# Patient Record
Sex: FEMALE | Race: WHITE | Employment: FULL TIME | URBAN - NONMETROPOLITAN AREA
[De-identification: names, ages, dates, MRNs, and addresses within clinical notes are randomized per-mention and may not be internally consistent; named-entity substitution may affect disease eponyms.]

---

## 2024-10-14 ENCOUNTER — PATIENT MESSAGE (OUTPATIENT)
Dept: FAMILY MEDICINE CLINIC | Age: 44
End: 2024-10-14

## 2024-10-14 DIAGNOSIS — E78.1 HYPERTRIGLYCERIDEMIA: Primary | ICD-10-CM

## 2024-10-14 DIAGNOSIS — Z13.21 ENCOUNTER FOR VITAMIN DEFICIENCY SCREENING: ICD-10-CM

## 2024-10-14 DIAGNOSIS — Z13.1 SCREENING FOR DIABETES MELLITUS: ICD-10-CM

## 2024-10-14 DIAGNOSIS — Z13.29 SCREENING FOR THYROID DISORDER: ICD-10-CM

## 2024-10-21 LAB
ALBUMIN: 4.7 G/DL
ALP BLD-CCNC: 43 U/L
ALT SERPL-CCNC: 23 U/L
ANION GAP SERPL CALCULATED.3IONS-SCNC: NORMAL MMOL/L
AST SERPL-CCNC: 23 U/L
BASOPHILS ABSOLUTE: 0.06 /ΜL
BASOPHILS RELATIVE PERCENT: 1 %
BILIRUB SERPL-MCNC: 0.4 MG/DL (ref 0.1–1.4)
BUN BLDV-MCNC: 17 MG/DL
CALCIUM SERPL-MCNC: 9.7 MG/DL
CHLORIDE BLD-SCNC: 102 MMOL/L
CHOLESTEROL, TOTAL: 214 MG/DL
CHOLESTEROL/HDL RATIO: 6
CO2: 27 MMOL/L
CREAT SERPL-MCNC: 0.7 MG/DL
EOSINOPHILS ABSOLUTE: 0.12 /ΜL
EOSINOPHILS RELATIVE PERCENT: 2 %
GFR, ESTIMATED: NORMAL
GLUCOSE BLD-MCNC: 81 MG/DL
HCT VFR BLD CALC: 39.5 % (ref 36–46)
HDLC SERPL-MCNC: 37 MG/DL (ref 35–70)
HEMOGLOBIN: 13.4 G/DL (ref 12–16)
LDL CHOLESTEROL: 123
LYMPHOCYTES ABSOLUTE: 2.39 /ΜL
LYMPHOCYTES RELATIVE PERCENT: 31 %
MCH RBC QN AUTO: 32 PG
MCHC RBC AUTO-ENTMCNC: 33.9 G/DL
MCV RBC AUTO: 94.3 FL
MONOCYTES ABSOLUTE: 0.71 /ΜL
MONOCYTES RELATIVE PERCENT: 9 %
NEUTROPHILS ABSOLUTE: 4.46 /ΜL
NEUTROPHILS RELATIVE PERCENT: 56 %
NONHDLC SERPL-MCNC: ABNORMAL MG/DL
PDW BLD-RTO: 12.3 %
PLATELET # BLD: 235 K/ΜL
PMV BLD AUTO: 9.5 FL
POTASSIUM SERPL-SCNC: 4.4 MMOL/L
RBC # BLD: 4.19 10^6/ΜL
SODIUM BLD-SCNC: 140 MMOL/L
TOTAL PROTEIN: 6.8 G/DL (ref 6.4–8.2)
TRIGL SERPL-MCNC: 274 MG/DL
TSH SERPL DL<=0.05 MIU/L-ACNC: 3 UIU/ML
VITAMIN D 25-HYDROXY: 52.6
VITAMIN D2, 25 HYDROXY: NORMAL
VITAMIN D3,25 HYDROXY: NORMAL
VLDLC SERPL CALC-MCNC: 55 MG/DL
WBC # BLD: 7.8 10^3/ML

## 2024-10-22 DIAGNOSIS — Z13.21 ENCOUNTER FOR VITAMIN DEFICIENCY SCREENING: ICD-10-CM

## 2024-10-22 DIAGNOSIS — Z13.29 SCREENING FOR THYROID DISORDER: ICD-10-CM

## 2024-10-22 DIAGNOSIS — E78.1 HYPERTRIGLYCERIDEMIA: ICD-10-CM

## 2024-10-22 DIAGNOSIS — Z13.1 SCREENING FOR DIABETES MELLITUS: ICD-10-CM

## 2024-10-23 ENCOUNTER — HOSPITAL ENCOUNTER (OUTPATIENT)
Age: 44
Setting detail: SPECIMEN
Discharge: HOME | End: 2024-10-23
Payer: COMMERCIAL

## 2024-10-23 ENCOUNTER — OFFICE VISIT (OUTPATIENT)
Dept: FAMILY MEDICINE CLINIC | Age: 44
End: 2024-10-23

## 2024-10-23 VITALS
WEIGHT: 177.3 LBS | HEART RATE: 84 BPM | BODY MASS INDEX: 26.26 KG/M2 | OXYGEN SATURATION: 97 % | HEIGHT: 69 IN | SYSTOLIC BLOOD PRESSURE: 116 MMHG | DIASTOLIC BLOOD PRESSURE: 68 MMHG

## 2024-10-23 DIAGNOSIS — E78.1 HYPERTRIGLYCERIDEMIA: ICD-10-CM

## 2024-10-23 DIAGNOSIS — Z12.11 SCREENING FOR COLON CANCER: ICD-10-CM

## 2024-10-23 DIAGNOSIS — N89.8 VAGINAL DISCHARGE: ICD-10-CM

## 2024-10-23 DIAGNOSIS — I47.10 SVT (SUPRAVENTRICULAR TACHYCARDIA) (HCC): ICD-10-CM

## 2024-10-23 DIAGNOSIS — N76.0 BACTERIAL VAGINOSIS: ICD-10-CM

## 2024-10-23 DIAGNOSIS — Z00.00 ROUTINE MEDICAL EXAM: Primary | ICD-10-CM

## 2024-10-23 DIAGNOSIS — B96.89 BACTERIAL VAGINOSIS: ICD-10-CM

## 2024-10-23 LAB
CANDIDA SPECIES: NEGATIVE
GARDNERELLA VAGINALIS: POSITIVE
SOURCE: ABNORMAL
TRICHOMONAS: NEGATIVE

## 2024-10-23 PROCEDURE — 87510 GARDNER VAG DNA DIR PROBE: CPT

## 2024-10-23 PROCEDURE — 87480 CANDIDA DNA DIR PROBE: CPT

## 2024-10-23 PROCEDURE — 87660 TRICHOMONAS VAGIN DIR PROBE: CPT

## 2024-10-23 RX ORDER — ACETAMINOPHEN 160 MG
2000 TABLET,DISINTEGRATING ORAL DAILY
COMMUNITY

## 2024-10-23 SDOH — ECONOMIC STABILITY: INCOME INSECURITY: HOW HARD IS IT FOR YOU TO PAY FOR THE VERY BASICS LIKE FOOD, HOUSING, MEDICAL CARE, AND HEATING?: NOT HARD AT ALL

## 2024-10-23 SDOH — ECONOMIC STABILITY: FOOD INSECURITY: WITHIN THE PAST 12 MONTHS, THE FOOD YOU BOUGHT JUST DIDN'T LAST AND YOU DIDN'T HAVE MONEY TO GET MORE.: NEVER TRUE

## 2024-10-23 SDOH — ECONOMIC STABILITY: FOOD INSECURITY: WITHIN THE PAST 12 MONTHS, YOU WORRIED THAT YOUR FOOD WOULD RUN OUT BEFORE YOU GOT MONEY TO BUY MORE.: NEVER TRUE

## 2024-10-23 NOTE — PROGRESS NOTES
[facility]  [address] [place] [address]  [phone]      Angie Hoffman is a 44 y.o. female who presents today for her medical conditions/complaints as noted below.  Angie Hoffman is c/o of Annual Exam (Physical )      HPI:     Pt here today for yearly physical.    Pt started getting sick with cold sx's approx 10 days ago - cough, post-nasal drainage, sore throat, laryngitis.  Had to miss 3 days of work last week.  Has been taking Mucinex and doing increased fluids.  Never had fever, but did have some body aches.    Has been having more heart palpitations/flutters recently.  When she was recently sick last week and this week, she had some chest tightness/heaviness and \"my heart hurt\".  This is improved this week, but she is still getting flutters often.  Admits she drinks 2 coffee drinks per day and knows she should drink less.  Knows she has also been stressed.  At her last Cardio visit on 8/12, it was noted that her palpitations had been better, but in the past month, she has felt them more.    Switched Toprol-XL 50 mg to bedtime to see if this would help with fatigue - unsure if this helped.  Higher doses of B-blocker made her BP too low.      Seeing Dr. Jane (Podiatry) for probable neuroma on her L foot - was treated with 2 courses of steroid, the second course of which did help.  Feels the pain in L foot is approx 80% improved; will f/u with him when the pain worsens again and he will start steroid injections in the area next.    Reviewed recent lab results with pt today.    Taking Vit D3 2000 IU daily.    Had Cologuard sent to her last year, but she did not complete it.    Has noticed more stress urinary incontinence, mostly with coughing/sneezing.  Has to wear a pantyliner every day.  Seems to be worsening.        Past Medical History:   Diagnosis Date    Hay fever     Headache(784.0)     Heart palpitations     at times no dx    Spider veins

## 2024-10-24 RX ORDER — METRONIDAZOLE 500 MG/1
500 TABLET ORAL 2 TIMES DAILY
Qty: 14 TABLET | Refills: 0 | Status: SHIPPED | OUTPATIENT
Start: 2024-10-24 | End: 2024-10-31

## 2024-10-30 ASSESSMENT — ENCOUNTER SYMPTOMS: CHEST TIGHTNESS: 1

## 2024-11-14 ENCOUNTER — HOSPITAL ENCOUNTER (OUTPATIENT)
Dept: MAMMOGRAPHY | Age: 44
Discharge: HOME | End: 2024-11-16
Attending: FAMILY MEDICINE
Payer: COMMERCIAL

## 2024-11-14 VITALS — BODY MASS INDEX: 25.18 KG/M2 | HEIGHT: 69 IN | WEIGHT: 170 LBS

## 2024-11-14 DIAGNOSIS — Z12.31 SCREENING MAMMOGRAM, ENCOUNTER FOR: ICD-10-CM

## 2024-11-14 PROCEDURE — 77063 BREAST TOMOSYNTHESIS BI: CPT

## 2024-12-16 ENCOUNTER — PATIENT MESSAGE (OUTPATIENT)
Dept: FAMILY MEDICINE CLINIC | Age: 44
End: 2024-12-16

## 2024-12-27 ENCOUNTER — OFFICE VISIT (OUTPATIENT)
Dept: FAMILY MEDICINE CLINIC | Age: 44
End: 2024-12-27

## 2024-12-27 VITALS
SYSTOLIC BLOOD PRESSURE: 118 MMHG | HEART RATE: 81 BPM | WEIGHT: 179.7 LBS | OXYGEN SATURATION: 98 % | BODY MASS INDEX: 26.62 KG/M2 | DIASTOLIC BLOOD PRESSURE: 68 MMHG | HEIGHT: 69 IN

## 2024-12-27 DIAGNOSIS — Z09 HOSPITAL DISCHARGE FOLLOW-UP: ICD-10-CM

## 2024-12-27 DIAGNOSIS — K52.9 ENTERITIS: Primary | ICD-10-CM

## 2024-12-27 PROBLEM — Z72.0 TOBACCO USE: Status: RESOLVED | Noted: 2020-06-27 | Resolved: 2024-12-27

## 2024-12-27 PROBLEM — E78.1 HYPERTRIGLYCERIDEMIA: Status: ACTIVE | Noted: 2024-12-27

## 2024-12-27 PROBLEM — U07.1 COVID-19 VIRUS INFECTION: Status: RESOLVED | Noted: 2021-11-05 | Resolved: 2024-12-27

## 2024-12-27 PROBLEM — N92.1 MENORRHAGIA WITH IRREGULAR CYCLE: Status: RESOLVED | Noted: 2020-12-17 | Resolved: 2024-12-27

## 2024-12-27 PROBLEM — Z98.890 STATUS POST ENDOMETRIAL ABLATION: Status: RESOLVED | Noted: 2021-01-20 | Resolved: 2024-12-27

## 2024-12-27 PROBLEM — K56.609 SMALL BOWEL OBSTRUCTION (HCC): Status: RESOLVED | Noted: 2020-06-27 | Resolved: 2024-12-27

## 2024-12-27 NOTE — PROGRESS NOTES
Post-Discharge Transitional Care Management Progress Note      Angie Hoffman   YOB: 1980    Date of Office Visit:  12/27/2024  Date of Hospital Admission: 12/12/24    Date of Hospital Discharge: 12/13/24    Care management risk score Rising risk (score 2-5) and Complex Care (Scores >=6): No Risk Score On File     Non face to face  following discharge, date last encounter closed (first attempt may have been earlier): *No documented post hospital discharge outreach found in the last 14 days *No documented post hospital discharge outreach found in the last 14 days    Call initiated 2 business days of discharge: *No response recorded in the last 14 days    ASSESSMENT/PLAN:   Enteritis  Hospital discharge follow-up  -     [place] DISCHARGE MEDS RECONCILED W/ CURRENT OUTPATIENT MED LIST    Medical Decision Making: moderate complexity  Return if symptoms worsen or fail to improve in 3-4 days.         Subjective:   HPI:  Follow up of Hospital problems/diagnosis(es):     After discharge, her stool started to form up slightly  Completed courses of both antibiotics x 4 days on discharge    Then, 2-3 days ago, she started to have diarrhea again; yesterday it was completely watery again  Had some lower abd pain with this and the sulfur smell is back mildly  Has not had BM today yet  Denies nausea/vomiting since hospital discharge    Had stool studies and blood cultures that were pending when she was discharged; stool culture and C diff later resulted negative.    Started taking Nutrafol for hair loss on 11/6 - states the smell of these pills mimics the sulfur smell she had in her stool when she went to hospital.  Also realized she has been taking 10,000 mcg of Biotin daily, plus the 2500 mcg in her Nutrafol supplement.    Plans to do a sugar fast for 21 days with her [employer] prayer and fast starting 1/5/25.        Inpatient course: Discharge summary reviewed- see chart.    Interval history/Current status:

## 2025-01-03 ASSESSMENT — ENCOUNTER SYMPTOMS
ABDOMINAL PAIN: 1
NAUSEA: 0
DIARRHEA: 1
VOMITING: 0

## 2025-01-10 ENCOUNTER — PATIENT MESSAGE (OUTPATIENT)
Dept: FAMILY MEDICINE CLINIC | Age: 45
End: 2025-01-10

## 2025-01-10 DIAGNOSIS — Z20.2 POSSIBLE EXPOSURE TO STD: Primary | ICD-10-CM

## 2025-01-17 ENCOUNTER — OFFICE VISIT (OUTPATIENT)
Dept: FAMILY MEDICINE CLINIC | Age: 45
End: 2025-01-17

## 2025-01-17 ENCOUNTER — HOSPITAL ENCOUNTER (OUTPATIENT)
Age: 45
Discharge: HOME | End: 2025-01-17
Payer: COMMERCIAL

## 2025-01-17 ENCOUNTER — HOSPITAL ENCOUNTER (OUTPATIENT)
Age: 45
Setting detail: SPECIMEN
Discharge: HOME | End: 2025-01-17

## 2025-01-17 VITALS
HEART RATE: 61 BPM | DIASTOLIC BLOOD PRESSURE: 76 MMHG | RESPIRATION RATE: 16 BRPM | SYSTOLIC BLOOD PRESSURE: 110 MMHG | HEIGHT: 69 IN | OXYGEN SATURATION: 98 % | WEIGHT: 183.2 LBS | BODY MASS INDEX: 27.13 KG/M2 | TEMPERATURE: 97.4 F

## 2025-01-17 DIAGNOSIS — L98.9 SKIN LESION: ICD-10-CM

## 2025-01-17 DIAGNOSIS — N89.8 VAGINAL ITCHING: Primary | ICD-10-CM

## 2025-01-17 DIAGNOSIS — A60.1 HERPES SIMPLEX INFECTION OF PERIANAL SKIN: ICD-10-CM

## 2025-01-17 DIAGNOSIS — N89.8 VAGINAL ITCHING: ICD-10-CM

## 2025-01-17 DIAGNOSIS — Z20.2 POSSIBLE EXPOSURE TO STD: ICD-10-CM

## 2025-01-17 DIAGNOSIS — Z11.3 SCREEN FOR STD (SEXUALLY TRANSMITTED DISEASE): ICD-10-CM

## 2025-01-17 LAB
CANDIDA SPECIES: NEGATIVE
GARDNERELLA VAGINALIS: POSITIVE
SOURCE: ABNORMAL
TRICHOMONAS: NEGATIVE

## 2025-01-17 PROCEDURE — 87591 N.GONORRHOEAE DNA AMP PROB: CPT

## 2025-01-17 PROCEDURE — 87389 HIV-1 AG W/HIV-1&-2 AB AG IA: CPT

## 2025-01-17 PROCEDURE — 86694 HERPES SIMPLEX NES ANTBDY: CPT

## 2025-01-17 PROCEDURE — 87510 GARDNER VAG DNA DIR PROBE: CPT

## 2025-01-17 PROCEDURE — 86780 TREPONEMA PALLIDUM: CPT

## 2025-01-17 PROCEDURE — 87529 HSV DNA AMP PROBE: CPT

## 2025-01-17 PROCEDURE — 87491 CHLMYD TRACH DNA AMP PROBE: CPT

## 2025-01-17 PROCEDURE — 86695 HERPES SIMPLEX TYPE 1 TEST: CPT

## 2025-01-17 PROCEDURE — 80074 ACUTE HEPATITIS PANEL: CPT

## 2025-01-17 PROCEDURE — 86696 HERPES SIMPLEX TYPE 2 TEST: CPT

## 2025-01-17 PROCEDURE — 36415 COLL VENOUS BLD VENIPUNCTURE: CPT

## 2025-01-17 PROCEDURE — 87480 CANDIDA DNA DIR PROBE: CPT

## 2025-01-17 PROCEDURE — 87660 TRICHOMONAS VAGIN DIR PROBE: CPT

## 2025-01-17 RX ORDER — METRONIDAZOLE 500 MG/1
500 TABLET ORAL 2 TIMES DAILY
Qty: 14 TABLET | Refills: 0 | Status: SHIPPED | OUTPATIENT
Start: 2025-01-17 | End: 2025-01-24

## 2025-01-17 SDOH — ECONOMIC STABILITY: FOOD INSECURITY: WITHIN THE PAST 12 MONTHS, YOU WORRIED THAT YOUR FOOD WOULD RUN OUT BEFORE YOU GOT MONEY TO BUY MORE.: NEVER TRUE

## 2025-01-17 SDOH — ECONOMIC STABILITY: FOOD INSECURITY: WITHIN THE PAST 12 MONTHS, THE FOOD YOU BOUGHT JUST DIDN'T LAST AND YOU DIDN'T HAVE MONEY TO GET MORE.: NEVER TRUE

## 2025-01-17 ASSESSMENT — PATIENT HEALTH QUESTIONNAIRE - PHQ9
1. LITTLE INTEREST OR PLEASURE IN DOING THINGS: NOT AT ALL
SUM OF ALL RESPONSES TO PHQ QUESTIONS 1-9: 0
2. FEELING DOWN, DEPRESSED OR HOPELESS: NOT AT ALL
SUM OF ALL RESPONSES TO PHQ9 QUESTIONS 1 & 2: 0
SUM OF ALL RESPONSES TO PHQ QUESTIONS 1-9: 0

## 2025-01-17 NOTE — PROGRESS NOTES
[facility]  [address] [place] [address]  [phone]      Angie Hoffman is a 44 y.o. female who presents today for her medical conditions/complaints as noted below.  Angie Hoffman is c/o of Vaginal Itching      HPI:     Pt here today for vaginal itching.    Vaginal Itching  The patient's primary symptoms include genital itching, a genital odor and vaginal discharge (more thin/runny than usual). The current episode started in the past 7 days. The problem has been unchanged. The vaginal discharge was clear. There has been no bleeding. She has tried nothing for the symptoms.     Has had BV twice in the past year - most recently in 10/2024.  Feels similar to when she had this last time.  Had Flagyl at that time; also had course in mid-December for enteritis.      Has decreased her Nutrafol to 2 tabs daily and Biotin to 5000 mcg daily and she has had no further diarrhea.          Past Medical History:   Diagnosis Date    Ulcer     starting ulcer when freshman in  - hospitalization    Venous reflux     deep and superficial venous reflux bilaterally - injection sclerotherapy       Past Surgical History:   Procedure Laterality Date    ABDOMEN SURGERY  05/01/2017    Tummy tuck with muscle repair    BACK SURGERY  2012, 2013    BREAST REDUCTION SURGERY  2000    CHOLECYSTECTOMY  01/2014    DILATION AND CURETTAGE OF UTERUS  11/18/2016    hysteroscopy, D&C    DILATION AND CURETTAGE OF UTERUS N/A 01/06/2021    D & C HYSTEROSCOPY ABLATION performed by Chloe Freed MD at [facility] OR    ENDOMETRIAL ABLATION  01/06/2021    HERNIA REPAIR N/A 08/18/2020    Laparoscopic Robotic Assisted Ventral Hernia Repair with Mesh performed by Main Hearn MD at [facility] OR     Family History   Problem Relation Age of Onset    Cancer Mother         leukemia    Hypertension Father     No Known Problems Daughter     Uterine Cancer Maternal Grandmother     Diabetes Paternal Grandmother     Heart Attack 
Alert and oriented to person, place and time

## 2025-01-18 LAB
HAV IGM SERPL QL IA: NONREACTIVE
HBV CORE IGM SERPL QL IA: NONREACTIVE
HBV SURFACE AG SERPL QL IA: NONREACTIVE
HCV AB SERPL QL IA: NONREACTIVE
HIV 1+2 AB+HIV1 P24 AG SERPL QL IA: NONREACTIVE
MICROORGANISM SPEC CULT: NORMAL
SPECIMEN DESCRIPTION: NORMAL
T PALLIDUM AB SER QL IA: NONREACTIVE

## 2025-01-20 ENCOUNTER — PATIENT MESSAGE (OUTPATIENT)
Dept: FAMILY MEDICINE CLINIC | Age: 45
End: 2025-01-20

## 2025-01-20 DIAGNOSIS — N76.0 BACTERIAL VAGINOSIS: Primary | ICD-10-CM

## 2025-01-20 DIAGNOSIS — A60.1 HERPES SIMPLEX INFECTION OF PERIANAL SKIN: ICD-10-CM

## 2025-01-20 DIAGNOSIS — B96.89 BACTERIAL VAGINOSIS: Primary | ICD-10-CM

## 2025-01-20 LAB
CHLAMYDIA DNA UR QL NAA+PROBE: NEGATIVE
N GONORRHOEA DNA UR QL NAA+PROBE: NEGATIVE
SPECIMEN DESCRIPTION: NORMAL

## 2025-01-20 RX ORDER — VALACYCLOVIR HYDROCHLORIDE 1 G/1
1000 TABLET, FILM COATED ORAL 2 TIMES DAILY
Qty: 20 TABLET | Refills: 0 | Status: SHIPPED | OUTPATIENT
Start: 2025-01-20 | End: 2025-01-30

## 2025-01-21 LAB
HSV1 IGG SERPL QL IA: 2.28
HSV1+2 IGM SER QL IA: 0.63
HSV2 AB SER QL IA: 3.54

## 2025-01-24 RX ORDER — METRONIDAZOLE 7.5 MG/G
GEL TOPICAL
Qty: 45 G | Refills: 0 | Status: SHIPPED | OUTPATIENT
Start: 2025-01-24

## 2025-01-24 RX ORDER — VALACYCLOVIR HYDROCHLORIDE 500 MG/1
500 TABLET, FILM COATED ORAL 2 TIMES DAILY
Qty: 6 TABLET | Refills: 1 | Status: SHIPPED | OUTPATIENT
Start: 2025-01-24 | End: 2025-01-27

## 2025-03-12 ENCOUNTER — PATIENT MESSAGE (OUTPATIENT)
Dept: FAMILY MEDICINE CLINIC | Age: 45
End: 2025-03-12

## 2025-03-12 DIAGNOSIS — H93.12 TINNITUS OF LEFT EAR: ICD-10-CM

## 2025-03-12 DIAGNOSIS — H91.92 HEARING LOSS OF LEFT EAR, UNSPECIFIED HEARING LOSS TYPE: Primary | ICD-10-CM

## 2025-03-12 NOTE — TELEPHONE ENCOUNTER
Does pt need another OV to discuss this? Or will Dr. Jacinto's old notes be OK to send with referral?

## 2025-03-17 ENCOUNTER — OFFICE VISIT (OUTPATIENT)
Dept: CARDIOLOGY | Age: 45
End: 2025-03-17
Payer: COMMERCIAL

## 2025-03-17 VITALS
DIASTOLIC BLOOD PRESSURE: 72 MMHG | BODY MASS INDEX: 27.08 KG/M2 | WEIGHT: 182.8 LBS | HEIGHT: 69 IN | HEART RATE: 77 BPM | SYSTOLIC BLOOD PRESSURE: 124 MMHG

## 2025-03-17 DIAGNOSIS — I10 ESSENTIAL HYPERTENSION: ICD-10-CM

## 2025-03-17 DIAGNOSIS — I44.0 1ST DEGREE AV BLOCK: ICD-10-CM

## 2025-03-17 DIAGNOSIS — R00.0 TACHYCARDIA: ICD-10-CM

## 2025-03-17 DIAGNOSIS — R00.2 PALPITATIONS: Primary | ICD-10-CM

## 2025-03-17 PROCEDURE — 3078F DIAST BP <80 MM HG: CPT | Performed by: INTERNAL MEDICINE

## 2025-03-17 PROCEDURE — 93000 ELECTROCARDIOGRAM COMPLETE: CPT | Performed by: INTERNAL MEDICINE

## 2025-03-17 PROCEDURE — 99214 OFFICE O/P EST MOD 30 MIN: CPT | Performed by: INTERNAL MEDICINE

## 2025-03-17 PROCEDURE — 3074F SYST BP LT 130 MM HG: CPT | Performed by: INTERNAL MEDICINE

## 2025-03-17 NOTE — PROGRESS NOTES
Cardiology Consultation/Follow Up.  [facility]    Angie Hoffman  [id number]  1980    03/17/25     CC: Patient is here for 6 month follow-up    HPI:  Angie Hoffman Patient is here for 6 month follow up for : palpitations.   States she feels well.   No cp.   No sob.   No palpitations.   No le edema.   No syncope.   No dizziness.   Trying to get back into some exercising.     Past Medical:  Past Medical History:   Diagnosis Date    Ulcer     starting ulcer when freshman in HS - hospitalization    Venous reflux     deep and superficial venous reflux bilaterally - injection sclerotherapy      Past Surgical:  Past Surgical History:   Procedure Laterality Date    ABDOMEN SURGERY  05/01/2017    Tummy tuck with muscle repair    BACK SURGERY  2012, 2013    BREAST REDUCTION SURGERY  2000    CHOLECYSTECTOMY  01/2014    DILATION AND CURETTAGE OF UTERUS  11/18/2016    hysteroscopy, D&C    DILATION AND CURETTAGE OF UTERUS N/A 01/06/2021    D & C HYSTEROSCOPY ABLATION performed by Chloe Freed MD at [facility] OR    ENDOMETRIAL ABLATION  01/06/2021    HERNIA REPAIR N/A 08/18/2020    Laparoscopic Robotic Assisted Ventral Hernia Repair with Mesh performed by Main Hearn MD at [facility] OR     Family History:  Family History   Problem Relation Age of Onset    Cancer Mother         leukemia    Hypertension Father     No Known Problems Daughter     Uterine Cancer Maternal Grandmother     Diabetes Paternal Grandmother     Heart Attack Paternal Grandfather     Cancer Maternal Aunt         breast    Breast Cancer Maternal Aunt     No Known Problems Maternal Aunt     No Known Problems Maternal Aunt     No Known Problems Maternal Aunt     No Known Problems Maternal Aunt     No Known Problems Maternal Aunt     No Known Problems Maternal Aunt     No Known Problems Maternal Aunt     No Known Problems Paternal Aunt      Social History:  Social History     Tobacco Use    Smoking status: Former     Current packs/day: 0.00